# Patient Record
Sex: FEMALE | URBAN - METROPOLITAN AREA
[De-identification: names, ages, dates, MRNs, and addresses within clinical notes are randomized per-mention and may not be internally consistent; named-entity substitution may affect disease eponyms.]

---

## 2021-04-06 ENCOUNTER — APPOINTMENT (RX ONLY)
Dept: URBAN - METROPOLITAN AREA CLINIC 55 | Facility: CLINIC | Age: 31
Setting detail: DERMATOLOGY
End: 2021-04-06

## 2021-04-06 DIAGNOSIS — Z41.9 ENCOUNTER FOR PROCEDURE FOR PURPOSES OTHER THAN REMEDYING HEALTH STATE, UNSPECIFIED: ICD-10-CM

## 2021-04-06 PROCEDURE — ? COSMETIC CONSULTATION: SCLEROTHERAPY

## 2021-04-23 ENCOUNTER — APPOINTMENT (RX ONLY)
Dept: URBAN - METROPOLITAN AREA CLINIC 55 | Facility: CLINIC | Age: 31
Setting detail: DERMATOLOGY
End: 2021-04-23

## 2021-04-23 DIAGNOSIS — Z41.9 ENCOUNTER FOR PROCEDURE FOR PURPOSES OTHER THAN REMEDYING HEALTH STATE, UNSPECIFIED: ICD-10-CM

## 2021-04-23 PROCEDURE — ? SCLEROTHERAPY (COSMETIC)

## 2021-04-23 ASSESSMENT — LOCATION DETAILED DESCRIPTION DERM: LOCATION DETAILED: LEFT DISTAL LATERAL POSTERIOR THIGH

## 2021-04-23 ASSESSMENT — LOCATION ZONE DERM: LOCATION ZONE: LEG

## 2021-04-23 ASSESSMENT — LOCATION SIMPLE DESCRIPTION DERM: LOCATION SIMPLE: LEFT POSTERIOR THIGH

## 2021-04-23 NOTE — PROCEDURE: SCLEROTHERAPY (COSMETIC)
Price (Use Numbers Only, No Special Characters Or $): 185
Post-Care Instructions: Compression for 7 days post procedure.. Compliance with compression helps to prevent blood clots and minimizes pain, swelling, bruising, skin discoloration (staining) and the recurrence of vessels. compression is needed only during waking hours.  However, if your leg feels better with compression at night, then you may continue compression at night as  needed. For 2 days after treatment: Avoid aerobic exercise, weight training, and all other types of exertion. Do not get a tan for one month after sclerotherapy. Tanning increases your risk of skin discoloration. After 24 hours, you may shower or bathe in tepid water. Avoid immersion in hot tubs.For pain or discomfort you may take acetaminophen. Possible side effects following sclerotherapy  After sclerotherapy, mild swelling is expected. The injection sites may also become bruised. You may also experience one or more of the following side effects, which almost always go away within one to four months:       Darkening of the injected veins, brownish staining of the skin, small clotted vessels under the surface of the skin that you can feel . If you experience any of the following: treated areas become increasingly sore, tender, red or warm or significant swelling or pain in the leg please call [facility].
Sclerosant Volume (Cc): 2.5
Sclerosant Volume (Cc): 10
Detail Level: Detailed
Consent was obtained with risks, benefits, and alternatives discussed for the above procedures.  Photographs were taken.
Disposition: Compression stockings applied by patient post procedure.

## 2021-06-01 ENCOUNTER — APPOINTMENT (RX ONLY)
Dept: URBAN - METROPOLITAN AREA CLINIC 55 | Facility: CLINIC | Age: 31
Setting detail: DERMATOLOGY
End: 2021-06-01

## 2021-06-01 DIAGNOSIS — Z41.9 ENCOUNTER FOR PROCEDURE FOR PURPOSES OTHER THAN REMEDYING HEALTH STATE, UNSPECIFIED: ICD-10-CM

## 2021-06-01 PROCEDURE — ? SCLEROTHERAPY (COSMETIC)

## 2021-06-01 ASSESSMENT — LOCATION DETAILED DESCRIPTION DERM: LOCATION DETAILED: LEFT DISTAL LATERAL POSTERIOR THIGH

## 2021-06-01 ASSESSMENT — LOCATION SIMPLE DESCRIPTION DERM: LOCATION SIMPLE: LEFT POSTERIOR THIGH

## 2021-06-01 ASSESSMENT — LOCATION ZONE DERM: LOCATION ZONE: LEG

## 2021-06-01 NOTE — PROCEDURE: SCLEROTHERAPY (COSMETIC)
Sclerosant Volume (Cc): 1.5
Sclerosant Volume (Cc): 10
Post-Care Instructions: Compression for 7 days post procedure.. Compliance with compression helps to prevent blood clots and minimizes pain, swelling, bruising, skin discoloration (staining) and the recurrence of vessels. compression is needed only during waking hours.  However, if your leg feels better with compression at night, then you may continue compression at night as  needed. For 2 days after treatment: Avoid aerobic exercise, weight training, and all other types of exertion. Do not get a tan for one month after sclerotherapy. Tanning increases your risk of skin discoloration. After 24 hours, you may shower or bathe in tepid water. Avoid immersion in hot tubs.For pain or discomfort you may take acetaminophen. Possible side effects following sclerotherapy  After sclerotherapy, mild swelling is expected. The injection sites may also become bruised. You may also experience one or more of the following side effects, which almost always go away within one to four months:       Darkening of the injected veins, brownish staining of the skin, small clotted vessels under the surface of the skin that you can feel . If you experience any of the following: treated areas become increasingly sore, tender, red or warm or significant swelling or pain in the leg please call [facility].
Detail Level: Detailed
Consent was obtained with risks, benefits, and alternatives discussed for the above procedures.  Photographs were taken.
Price (Use Numbers Only, No Special Characters Or $): 185
Disposition: Compression stockings applied by patient post procedure.
Cigarettes

## 2021-07-20 ENCOUNTER — APPOINTMENT (RX ONLY)
Dept: URBAN - METROPOLITAN AREA CLINIC 55 | Facility: CLINIC | Age: 31
Setting detail: DERMATOLOGY
End: 2021-07-20

## 2021-07-20 DIAGNOSIS — Z41.9 ENCOUNTER FOR PROCEDURE FOR PURPOSES OTHER THAN REMEDYING HEALTH STATE, UNSPECIFIED: ICD-10-CM

## 2021-07-20 PROCEDURE — ? BOTOX

## 2021-07-20 NOTE — PROCEDURE: BOTOX
Additional Area 1 Units: 0
Show Ucl Units: No
Price (Use Numbers Only, No Special Characters Or $): 216
Show Additional Area 2: Yes
Detail Level: Detailed
Lot #: [id number]
Consent: Written consent obtained. Patient denies pregnancy and breastfeeding. Risks include but not limited to lid/brow ptosis, bruising, swelling, diplopia, temporary effect, incomplete chemical denervation.
Additional Area 1 Location: upper lip
Additional Area 2 Location: chin
Expiration Date (Month Year): 8/2023
Dilution (U/0.1 Cc): 4
Forehead Units: 6
Glabellar Complex Units: 12
Post-Care Instructions: Patient instructed to not lie down for 4 hours post procedure and informed not to manipulate treatment area for 4 hours. \\nRecommended follow up in 7 days if needed.

## 2021-08-16 ENCOUNTER — APPOINTMENT (RX ONLY)
Dept: URBAN - METROPOLITAN AREA CLINIC 55 | Facility: CLINIC | Age: 31
Setting detail: DERMATOLOGY
End: 2021-08-16

## 2021-08-16 DIAGNOSIS — Z41.9 ENCOUNTER FOR PROCEDURE FOR PURPOSES OTHER THAN REMEDYING HEALTH STATE, UNSPECIFIED: ICD-10-CM

## 2021-08-16 PROCEDURE — ? HYDRAFACIAL

## 2021-08-16 PROCEDURE — ? OTHER

## 2021-08-16 ASSESSMENT — LOCATION SIMPLE DESCRIPTION DERM: LOCATION SIMPLE: LEFT FOREHEAD

## 2021-08-16 ASSESSMENT — LOCATION ZONE DERM: LOCATION ZONE: FACE

## 2021-08-16 ASSESSMENT — LOCATION DETAILED DESCRIPTION DERM: LOCATION DETAILED: LEFT MEDIAL FOREHEAD

## 2021-08-16 NOTE — PROCEDURE: HYDRAFACIAL
Consent: Written consent obtained, risks reviewed including but not limited to crusting, scabbing, blistering, scarring, darker or lighter pigmentary change, bruising, and/or incomplete response. Patient given written copy of pre/post care instructions and consent form. Patient given written pre/post care handouts and consent form.
Post-Care Instructions: I reviewed with the patient in detail post-care instructions. Patient should stay away from the sun and wear sun protection until treated areas are fully healed.
Indication: acne
Number Of Passes: 2
Vacuum Pressure: 11
Additional Vacuum Pressure (Won't Render If 0): 0
Detail Level: Zone
Treatment Number: 1
Glycolic Acid %: 7.5%

## 2021-08-16 NOTE — PROCEDURE: OTHER
Render Risk Assessment In Note?: no
Note Text (......Xxx Chief Complaint.): This diagnosis correlates with the
Detail Level: Zone
Other (Free Text): Patient reports her main areas of skin concern are acne breakouts. Is getting  next week and wants to look refreshed. States she prefers to keep her skincare regimen very basic, uses CeraVe and Cetaphil cleansers/moisturizer. Encouraged daily SPF. Declines adding any other treatments to her regimen. States her skin is not sensitive in particular, but does have redness with treatments.

## 2022-02-09 ENCOUNTER — APPOINTMENT (RX ONLY)
Dept: URBAN - METROPOLITAN AREA CLINIC 55 | Facility: CLINIC | Age: 32
Setting detail: DERMATOLOGY
End: 2022-02-09

## 2022-02-09 DIAGNOSIS — Z41.9 ENCOUNTER FOR PROCEDURE FOR PURPOSES OTHER THAN REMEDYING HEALTH STATE, UNSPECIFIED: ICD-10-CM

## 2022-02-09 PROCEDURE — ? DYSPORT

## 2022-02-09 NOTE — PROCEDURE: DYSPORT
Show Topical Anesthesia: Yes
Left Periorbital Skin Units: 0
Dilution (U/0.1 Cc): 10
Show Right And Left Periorbital Units: No
Additional Area 1 Location: lip
Consent: Written consent obtained. Risks include but not limited to lid/brow ptosis, bruising, swelling, diplopia, temporary effect, incomplete chemical denervation.
Post-Care Instructions: Patient instructed to not lie down for 4 hours and limit physical activity for 24 hours. Follow up 7 days if needed.
Glabellar Complex Units: 12
Forehead Units: 6
Lot #: [id number]
Detail Level: Detailed
Expiration Date (Month Year): 8/30/2022

## 2023-06-14 ENCOUNTER — APPOINTMENT (RX ONLY)
Dept: URBAN - METROPOLITAN AREA CLINIC 55 | Facility: CLINIC | Age: 33
Setting detail: DERMATOLOGY
End: 2023-06-14

## 2023-06-14 DIAGNOSIS — Z41.9 ENCOUNTER FOR PROCEDURE FOR PURPOSES OTHER THAN REMEDYING HEALTH STATE, UNSPECIFIED: ICD-10-CM

## 2023-06-14 PROCEDURE — ? BOTOX

## 2023-06-14 NOTE — PROCEDURE: BOTOX
R Brow Units: 0
Show Right And Left Brow Units: No
Detail Level: Detailed
Show Lateral Platysmal Band Units: Yes
Show Inventory Tab: Show
Glabellar Complex Units: 16
Consent obtained and risk reviewed.
Forehead Units: 6
Post-Care Instructions: Discussed not to lie down flat  or vigorously rub the treatment area for the next 4 hours .
Dilution (U/0.1 Cc): 4
Incrementing Botox Units: By 0.5 Units
Comments: Make up was removed from treatment area and then prepped with 70% isopropyl alcohol prior to injections.
Additional Area 1 Location: upper lip

## 2023-10-04 ENCOUNTER — APPOINTMENT (RX ONLY)
Dept: URBAN - METROPOLITAN AREA CLINIC 55 | Facility: CLINIC | Age: 33
Setting detail: DERMATOLOGY
End: 2023-10-04

## 2023-10-04 DIAGNOSIS — Z41.9 ENCOUNTER FOR PROCEDURE FOR PURPOSES OTHER THAN REMEDYING HEALTH STATE, UNSPECIFIED: ICD-10-CM

## 2023-10-04 PROCEDURE — ? SIGNATURE HYDRAFACIAL

## 2023-10-04 PROCEDURE — ? INVENTORY

## 2023-10-04 ASSESSMENT — LOCATION ZONE DERM: LOCATION ZONE: FACE

## 2023-10-04 ASSESSMENT — LOCATION SIMPLE DESCRIPTION DERM: LOCATION SIMPLE: GLABELLA

## 2023-10-04 ASSESSMENT — LOCATION DETAILED DESCRIPTION DERM: LOCATION DETAILED: GLABELLA

## 2024-01-31 ENCOUNTER — APPOINTMENT (RX ONLY)
Dept: URBAN - METROPOLITAN AREA CLINIC 55 | Facility: CLINIC | Age: 34
Setting detail: DERMATOLOGY
End: 2024-01-31

## 2024-01-31 DIAGNOSIS — Z41.9 ENCOUNTER FOR PROCEDURE FOR PURPOSES OTHER THAN REMEDYING HEALTH STATE, UNSPECIFIED: ICD-10-CM

## 2024-01-31 PROCEDURE — ? BOTOX

## 2024-01-31 NOTE — PROCEDURE: BOTOX
Additional Area 5 Units: 0
Show Right And Left Brow Units: No
Show Depressor Anguli Units: Yes
Glabellar Complex Units: 16
Comments: Make up was removed from treatment area and then prepped with 70% isopropyl alcohol prior to injections.
Show Inventory Tab: Show
Forehead Units: 6
Dilution (U/0.1 Cc): 4
Post-Care Instructions: Discussed not to lie down flat  or vigorously rub the treatment area for the next 4 hours .
Detail Level: Detailed
Additional Area 1 Location: upper lip
Consent obtained and risk reviewed.
Incrementing Botox Units: By 0.5 Units

## 2024-03-20 ENCOUNTER — APPOINTMENT (RX ONLY)
Dept: URBAN - METROPOLITAN AREA CLINIC 55 | Facility: CLINIC | Age: 34
Setting detail: DERMATOLOGY
End: 2024-03-20

## 2024-03-20 DIAGNOSIS — Z41.9 ENCOUNTER FOR PROCEDURE FOR PURPOSES OTHER THAN REMEDYING HEALTH STATE, UNSPECIFIED: ICD-10-CM

## 2024-03-20 PROCEDURE — ? INVENTORY

## 2024-03-20 PROCEDURE — ? PLATINUM HYDRAFACIAL

## 2024-03-20 NOTE — PROCEDURE: PLATINUM HYDRAFACIAL
Indication: acne
Solution Override
Procedure: Extend and Protect
Procedure: Fusion
Post-Care Instructions: I reviewed with the patient in detail post-care instructions. Patient should stay away from the sun and wear sun protection until treated areas are fully healed.
Procedure: Peel
Procedure: Exfoliation
Vacuum Pressure Low Setting (Will Not Render If Set To 0): 0
Procedure: Extraction
Solution: GlySal 15%
Solution: Activ-4
Location: face
Tip: Hydropeel Tip, Clear
Solution: Beta-HD
Tip Override
Procedure: Boost
Tip: Hydropeel Tip, Teal
Consent: Written consent obtained, risks reviewed including but not limited to crusting, scabbing, blistering, scarring, darker or lighter pigmentary change, bruising, and/or incomplete response.
Treatment Number: 1
Tip: Hydropeel Tip, Blue